# Patient Record
Sex: MALE | Race: WHITE | Employment: UNEMPLOYED | URBAN - METROPOLITAN AREA
[De-identification: names, ages, dates, MRNs, and addresses within clinical notes are randomized per-mention and may not be internally consistent; named-entity substitution may affect disease eponyms.]

---

## 2018-04-15 ENCOUNTER — APPOINTMENT (OUTPATIENT)
Dept: GENERAL RADIOLOGY | Age: 58
End: 2018-04-15
Attending: PHYSICIAN ASSISTANT
Payer: OTHER GOVERNMENT

## 2018-04-15 ENCOUNTER — HOSPITAL ENCOUNTER (EMERGENCY)
Age: 58
Discharge: HOME | End: 2018-04-15
Attending: EMERGENCY MEDICINE | Admitting: EMERGENCY MEDICINE
Payer: OTHER GOVERNMENT

## 2018-04-15 VITALS
RESPIRATION RATE: 18 BRPM | WEIGHT: 183 LBS | HEART RATE: 64 BPM | TEMPERATURE: 98.4 F | OXYGEN SATURATION: 99 % | DIASTOLIC BLOOD PRESSURE: 65 MMHG | HEIGHT: 70 IN | SYSTOLIC BLOOD PRESSURE: 118 MMHG | BODY MASS INDEX: 26.2 KG/M2

## 2018-04-15 DIAGNOSIS — M79.671 PAIN OF RIGHT HEEL: Primary | ICD-10-CM

## 2018-04-15 PROCEDURE — 73650 X-RAY EXAM OF HEEL: CPT

## 2018-04-15 PROCEDURE — 99281 EMR DPT VST MAYX REQ PHY/QHP: CPT

## 2018-04-15 RX ORDER — CANE TIPS
1 EACH MISCELLANEOUS DAILY
Qty: 1 DEVICE | Refills: 0 | Status: SHIPPED | OUTPATIENT
Start: 2018-04-15

## 2018-04-15 NOTE — DISCHARGE INSTRUCTIONS
Heel Pain: Care Instructions  Your Care Instructions  You can have heel pain from an injury or from everyday overuse, such as running or walking a lot. Plantar fasciitis is the most common cause of heel pain. In this condition, the bottom of your foot from the front of the heel to the base of the toes is sore and hard to walk on. Your heel can get better with rest, anti-inflammatory pain medicines, and stretching exercises. Follow-up care is a key part of your treatment and safety. Be sure to make and go to all appointments, and call your doctor if you are having problems. It's also a good idea to know your test results and keep a list of the medicines you take. How can you care for yourself at home? · Rest your feet often. Reduce your activity to a level that lets you avoid pain. If possible, do not run or walk on hard surfaces. · Take anti-inflammatory medicines to reduce heel pain. These include ibuprofen (Advil, Motrin) and naproxen (Aleve). Read and follow all instructions on the label. · Put ice or a cold pack on your heel for 10 to 20 minutes at a time. Try to do this every 1 to 2 hours for the next 3 days (when you are awake). Put a thin cloth between the ice and your skin. · If ice isn't helping after 2 or 3 days, try heat, such as a heating pad set on low. · If your doctor says it is okay, try these calf stretches. Tight calf muscles can cause heel pain or make it worse. ¨ Stand about 1 foot from a wall. Place the palms of both hands against the wall at chest level and lean forward against the wall. Put the leg you want to stretch about a step behind your other leg. Keep your back heel on the floor and bend your front knee until you feel a stretch in the back leg. Hold this position for 15 to 30 seconds. Repeat the exercise 2 to 4 times a session. Do 3 to 4 sessions a day. ¨ Sit down on the floor or a mat with your feet stretched in front of you.  Roll up a towel lengthwise, and loop it over the ball of your foot. Holding the towel at both ends, gently pull the towel toward you to stretch your foot. Hold this position for 15 to 30 seconds. Repeat the exercise 2 to 4 times a session. Do 3 to 4 sessions a day. · Wear a night splint if your doctor suggests it. A night splint holds your foot with the toes pointed up. This position gives the bottom of your foot a constant, gentle stretch. · Wear shoes with good arch support. Athletic shoes or shoes with a well-cushioned sole are good choices. · Try a heel lift, heel cup or shoe insert (orthotic) to help cushion your heel. You can buy these at many shoe stores. Use them in both shoes, even if only one foot hurts. · Maintain a healthy weight. This puts less strain on your feet. When should you call for help? Call your doctor now or seek immediate medical care if:  ? · You have heel pain with fever, redness, or warmth in your heel. ? · You have numbness or tingling in your heel. ? Watch closely for changes in your health, and be sure to contact your doctor if:  ? · You cannot put weight on the sore foot. ? · Your heel pain lasts more than 2 weeks. Where can you learn more? Go to http://destin-herbert.info/. Enter S299 in the search box to learn more about \"Heel Pain: Care Instructions. \"  Current as of: March 20, 2017  Content Version: 11.4  © 1505-0486 [employer]. Care instructions adapted under license by [employer] (which disclaims liability or warranty for this information). If you have questions about a medical condition or this instruction, always ask your healthcare professional. [place] [address] any warranty or liability for your use of this information.

## 2018-04-15 NOTE — ED PROVIDER NOTES
EMERGENCY DEPARTMENT HISTORY AND PHYSICAL EXAM    10:09 AM      Date: 4/15/2018  Patient Name: Oniel Jones    History of Presenting Illness     Chief Complaint   Patient presents with    Foot Pain         History Provided By: Patient    Chief Complaint: R heel pain   Duration:  Days  Timing:  Acute  Location: R heel pain  Quality: Sharp  Severity: Moderate  Modifying Factors: worse with weight-bearing  Associated Symptoms: denies any other associated signs or symptoms      Additional History (Context): Oniel Jones is a 63 y.o. male with arthritis who presents with c/o R heel pain after jumping off of a 6 foot ladder yesterday. Pt notes he was able to walk after the injury but the pain became more severe this morning. Did not take any pain medication prior to arrival. Declining pain medication in ED. Denies back pain, hip pain, numbness/tingling, weakness. PCP: None        Past History     Past Medical History:  Past Medical History:   Diagnosis Date    Arthritis        Past Surgical History:  History reviewed. No pertinent surgical history. Family History:  Family History   Problem Relation Age of Onset    Heart Disease Father     Cancer Father      prostate       Social History:  Social History   Substance Use Topics    Smoking status: Never Smoker    Smokeless tobacco: None    Alcohol use Yes      Comment: occ       Allergies:  No Known Allergies      Review of Systems       Review of Systems   Constitutional: Negative for chills and fever. Respiratory: Negative for shortness of breath. Cardiovascular: Negative for chest pain. Gastrointestinal: Negative for abdominal pain, nausea and vomiting. Musculoskeletal: Positive for joint swelling. Skin: Negative for rash. Neurological: Negative for weakness. All other systems reviewed and are negative.         Physical Exam     Visit Vitals    /65 (BP 1 Location: Left arm, BP Patient Position: At rest;Sitting)    Pulse 64    Temp 98.4 °F (36.9 °C)    Resp 18    Ht 5' 10\" (1.778 m)    Wt 83 kg (183 lb)    SpO2 99%    BMI 26.26 kg/m2         Physical Exam   Constitutional: He appears well-developed and well-nourished. No distress. HENT:   Head: Normocephalic and atraumatic. Neck: Normal range of motion. Neck supple. Cardiovascular: Normal rate, regular rhythm, normal heart sounds and intact distal pulses. Exam reveals no gallop and no friction rub. No murmur heard. Pulmonary/Chest: Effort normal and breath sounds normal. No respiratory distress. He has no wheezes. He has no rales. Musculoskeletal:        Right hip: Normal.        Left hip: Normal.        Right ankle: Normal. Achilles tendon normal.        Left ankle: Normal.        Lumbar back: Normal.        Right foot: There is normal range of motion, no swelling, no crepitus and no deformity. Feet:    Dorsalis pedis 2+    Neurological: He is alert. Skin: Skin is warm. No rash noted. He is not diaphoretic. Nursing note and vitals reviewed. Diagnostic Study Results     Labs -  No results found for this or any previous visit (from the past 12 hour(s)). Radiologic Studies -   XR CALCANEUS RT    (Results Pending)   RADIOLOGY FINDINGS  R calcaneous X-ray shows no acute process  Pending review by Radiologist  Recorded by Rebecca Cherry PA-C        Medical Decision Making   I am the first provider for this patient. I reviewed the vital signs, available nursing notes, past medical history, past surgical history, family history and social history. Vital Signs-Reviewed the patient's vital signs. Pulse Oximetry Analysis -  99 on room air     Records Reviewed: Nursing Notes and Old Medical Records (Time of Review: 10:09 AM)    ED Course: Progress Notes, Reevaluation, and Consults:  10:30 AM: reviewed results with patient. Discussed need for close follow-up with orthopedic as outpatient.      Provider Notes (Medical Decision Making): 62 yo M who presents due to R heel pain after fall. No edema or ecchymosis. Extremity neurovascularly intact with full ROM. Achilles tendon intact. Calcaneous x-ray without acute process. No back or hip pain. Stable for d/c with outpatient follow-up with orthopedic. Diagnosis     Clinical Impression:   1. Pain of right heel        Disposition: home     Follow-up Information     Follow up With Details Comments Contact Info    [facility] EMERGENCY DEPT  If symptoms worsen [address] [id number] Schedule an appointment as soon as possible for a visit orthopedic  [address], [address]  [phone]           Patient's Medications   Start Taking    No medications on file   Continue Taking    No medications on file   These Medications have changed    No medications on file   Stop Taking    MELOXICAM (MOBIC) 7.5 MG TABLET    Take 1 Tab by mouth daily.